# Patient Record
Sex: MALE | Race: BLACK OR AFRICAN AMERICAN | NOT HISPANIC OR LATINO | URBAN - NONMETROPOLITAN AREA
[De-identification: names, ages, dates, MRNs, and addresses within clinical notes are randomized per-mention and may not be internally consistent; named-entity substitution may affect disease eponyms.]

---

## 2020-01-13 ENCOUNTER — IMPORTED ENCOUNTER (OUTPATIENT)
Dept: URBAN - NONMETROPOLITAN AREA CLINIC 1 | Facility: CLINIC | Age: 65
End: 2020-01-13

## 2020-01-13 PROBLEM — H25.13: Noted: 2020-01-13

## 2020-01-13 PROBLEM — H52.4: Noted: 2020-01-13

## 2020-01-13 PROBLEM — R73.03: Noted: 2020-01-13

## 2020-01-13 PROCEDURE — 92004 COMPRE OPH EXAM NEW PT 1/>: CPT

## 2020-01-13 PROCEDURE — 92015 DETERMINE REFRACTIVE STATE: CPT

## 2020-01-13 NOTE — PATIENT DISCUSSION
Prediabetes w/o Retinopathy OU -  discussed findings w/patient-  condition controlled with medication -  no retinopathy noted at this time -  discussed with patient the importance of good blood sugar control and the negative effects of poorly controlled sugars on ocular health -  monitor yearly or prn Cataracts OU -  discussed findings w/patient-  no treatment indicated at this time -  UV protection recommended-  monitor yearly or prnClinical Emmetropia OU w/Presbyopia-  discussed findings w/patient-  new spectacle Rx issued-  monitor yearly or prn; 's Notes: MR 1/13/2020DFE 1/13/2020

## 2022-04-16 ASSESSMENT — VISUAL ACUITY
OD_CC: 20/25
OS_CC: J1
OD_CC: J1
OS_CC: 20/25-1

## 2022-04-16 ASSESSMENT — TONOMETRY
OD_IOP_MMHG: 16
OS_IOP_MMHG: 16